# Patient Record
Sex: FEMALE | Race: WHITE | NOT HISPANIC OR LATINO
[De-identification: names, ages, dates, MRNs, and addresses within clinical notes are randomized per-mention and may not be internally consistent; named-entity substitution may affect disease eponyms.]

---

## 2024-04-22 ENCOUNTER — NON-APPOINTMENT (OUTPATIENT)
Age: 49
End: 2024-04-22

## 2024-04-22 DIAGNOSIS — Z87.19 PERSONAL HISTORY OF OTHER DISEASES OF THE DIGESTIVE SYSTEM: ICD-10-CM

## 2024-04-22 DIAGNOSIS — Z78.9 OTHER SPECIFIED HEALTH STATUS: ICD-10-CM

## 2024-04-22 PROBLEM — Z00.00 ENCOUNTER FOR PREVENTIVE HEALTH EXAMINATION: Status: ACTIVE | Noted: 2024-04-22

## 2024-04-22 RX ORDER — FAMOTIDINE 40 MG/1
40 TABLET, FILM COATED ORAL
Refills: 0 | Status: ACTIVE | COMMUNITY

## 2024-04-23 ENCOUNTER — APPOINTMENT (OUTPATIENT)
Dept: COLORECTAL SURGERY | Facility: CLINIC | Age: 49
End: 2024-04-23
Payer: COMMERCIAL

## 2024-04-23 VITALS
WEIGHT: 173 LBS | HEIGHT: 66 IN | DIASTOLIC BLOOD PRESSURE: 82 MMHG | SYSTOLIC BLOOD PRESSURE: 116 MMHG | BODY MASS INDEX: 27.8 KG/M2 | HEART RATE: 83 BPM

## 2024-04-23 PROCEDURE — 99203 OFFICE O/P NEW LOW 30 MIN: CPT

## 2024-04-23 RX ORDER — HYDROCORTISONE 10 MG/G
1 CREAM TOPICAL
Refills: 0 | Status: ACTIVE | COMMUNITY

## 2024-04-23 NOTE — ASSESSMENT
[FreeTextEntry1] : 49 F w/ a resolving left posterior (spontaneously draining) thrombosed externo-internal hemorrhoid. Plan: High fiber diet. Sitz baths. See again after 6-8 weeks. usual course of thrombosed hemorrhoid discussed. All questions answered.

## 2024-04-23 NOTE — PHYSICAL EXAM
[Abdomen Masses] : No abdominal masses [Abdomen Tenderness] : ~T No ~M abdominal tenderness [No HSM] : no hepatosplenomegaly [No Rash or Lesion] : No rash or lesion [Alert] : alert [Oriented to Person] : oriented to person [Oriented to Place] : oriented to place [Oriented to Time] : oriented to time [Calm] : calm [de-identified] : Soft [de-identified] : External: left posterior spontaneously draining thrombosed externo-internal hemorrhoid; OPHELIA and anoscopy deferred  [de-identified] : Normal [de-identified] : Normal [de-identified] : Normal [de-identified] : Normal [de-identified] : Normal

## 2024-04-23 NOTE — HISTORY OF PRESENT ILLNESS
[FreeTextEntry1] : 49-year-old female pt here with complaints of bleeding hemorrhoids that started on the weekend. Has been doing sitz baths and hydrocortisone cream 2.5% and that was prescribed by the NP at Dr. Preet Lawler (GI) yesterday Noted a little bit of improvement since applying the hydrocortisone. Still painful, taking Tylenol for pain, pain is both rectal and abdominal Feels the swelling on the outside Bleeding is on the tissue.  Was having more frequent BM's one a day on the weekend. Usual pattern is one BM every 2-3 days. When she is more active, she notes she is more regular  Last colonoscopy 2019- hemorrhoids

## 2024-06-04 ENCOUNTER — APPOINTMENT (OUTPATIENT)
Dept: COLORECTAL SURGERY | Facility: CLINIC | Age: 49
End: 2024-06-04
Payer: COMMERCIAL

## 2024-06-04 VITALS
DIASTOLIC BLOOD PRESSURE: 83 MMHG | SYSTOLIC BLOOD PRESSURE: 116 MMHG | HEIGHT: 66 IN | WEIGHT: 173 LBS | BODY MASS INDEX: 27.8 KG/M2 | OXYGEN SATURATION: 98 % | HEART RATE: 69 BPM

## 2024-06-04 PROCEDURE — 99214 OFFICE O/P EST MOD 30 MIN: CPT

## 2024-06-04 NOTE — ASSESSMENT
[FreeTextEntry1] : 49 F here for follow up. The left posterior thrombosed externo-internal hemorrhoid has resolved; she has posterior anal skin tag and small hemorrhoid. She developed right sided abdominal pain on 4/29, and went to urgent care. CT scan and ultrasonography did not report appendicitis. She is better after completing a course of antibiotics. Plan: High fiber diet. To see her GI MD for a colonoscopy and evaluation of abdominal pain. See again as needed. All questions answered.

## 2024-06-04 NOTE — HISTORY OF PRESENT ILLNESS
[FreeTextEntry1] : 49-year-old female pt for follow up. Last here 4/23/24 for resolving left posterior spontaneously draining thrombosed externo-internal hemorrhoid.  Reports the hemorrhoid 'is better'. Now, only occasional discomfort and swelling. No pain or bleeding. Has loose bowel movements at times.  However, she developed right sided abdominal pain on 4/29, and went to urgent care. Ct scan and ultrasonography were done; no appendicitis or diverticulitis. She was prescribed antibiotics, which helped her.  She is scheduled to follow up with her GI MD, and will get a colonoscopy done (last one 2019). Upper endoscopy has been done, and she is H Pylori negative.

## 2024-06-04 NOTE — PHYSICAL EXAM
[No Rash or Lesion] : No rash or lesion [Alert] : alert [Oriented to Person] : oriented to person [Oriented to Place] : oriented to place [Oriented to Time] : oriented to time [Calm] : calm [de-identified] : Normal [de-identified] : External: posterior anal skin tag and small hemorrhoid; OPHELIA uncomfortable, therefore not done completely. [de-identified] : Normal [de-identified] : Normal [de-identified] : Normal [de-identified] : Normal [de-identified] : Normal

## 2024-06-12 ENCOUNTER — NON-APPOINTMENT (OUTPATIENT)
Age: 49
End: 2024-06-12

## 2024-08-06 NOTE — HISTORY OF PRESENT ILLNESS
[FreeTextEntry1] : 49-year-old female pt last seen 6/4/24 for resolving thrombosed hemorrhoid   Last colonoscopy 8/14/19  Here for follow up

## 2024-08-13 ENCOUNTER — APPOINTMENT (OUTPATIENT)
Dept: COLORECTAL SURGERY | Facility: CLINIC | Age: 49
End: 2024-08-13